# Patient Record
Sex: MALE
[De-identification: names, ages, dates, MRNs, and addresses within clinical notes are randomized per-mention and may not be internally consistent; named-entity substitution may affect disease eponyms.]

---

## 2022-05-25 NOTE — NUR
05/25/22 1145 Rita Travis
1 MG EPI ADDED TO THE FIRST BAG OF LR PER ORDER FOR IRRIGATION.
EPI 0.25 ML MIXED W/ 50 ML NACL PER ORDER TO MAKE EPI 1:200,000 FOR
INJECTION AT [facility]. INTERSCALENE BLOCK COMPLETE IN PRE OP BY DR KRAMER.

## 2023-02-17 ENCOUNTER — HOSPITAL ENCOUNTER (OUTPATIENT)
Dept: HOSPITAL 95 - ORSCMMR | Age: 43
Discharge: HOME | End: 2023-02-17
Attending: STUDENT IN AN ORGANIZED HEALTH CARE EDUCATION/TRAINING PROGRAM
Payer: COMMERCIAL

## 2023-02-17 VITALS — WEIGHT: 235.89 LBS | BODY MASS INDEX: 34.94 KG/M2 | HEIGHT: 69 IN

## 2023-02-17 DIAGNOSIS — E88.81: ICD-10-CM

## 2023-02-17 DIAGNOSIS — Z79.899: ICD-10-CM

## 2023-02-17 DIAGNOSIS — K58.9: ICD-10-CM

## 2023-02-17 DIAGNOSIS — K40.90: Primary | ICD-10-CM

## 2023-02-17 PROCEDURE — A9270 NON-COVERED ITEM OR SERVICE: HCPCS

## 2023-02-17 PROCEDURE — C1781 MESH (IMPLANTABLE): HCPCS

## 2023-02-17 PROCEDURE — 49650 LAP ING HERNIA REPAIR INIT: CPT

## 2023-02-17 PROCEDURE — 0YU54JZ SUPPLEMENT RIGHT INGUINAL REGION WITH SYNTHETIC SUBSTITUTE, PERCUTANEOUS ENDOSCOPIC APPROACH: ICD-10-PCS | Performed by: STUDENT IN AN ORGANIZED HEALTH CARE EDUCATION/TRAINING PROGRAM

## 2023-02-17 PROCEDURE — 8E0W4CZ ROBOTIC ASSISTED PROCEDURE OF TRUNK REGION, PERCUTANEOUS ENDOSCOPIC APPROACH: ICD-10-PCS | Performed by: STUDENT IN AN ORGANIZED HEALTH CARE EDUCATION/TRAINING PROGRAM

## 2023-02-17 NOTE — NUR
Ambulatory in Day Surgery.
Discharge instructions reviewed with patient. Patient verbalizes understanding.
Copy given to patient to take home.Lungs clear T/O to Auscultation.
Dressing to procedure site clean, dry, intact with no visible
drainage, swelling, erythema or bruising noted.
Discharged via wheelchair to private car for ride home.